# Patient Record
Sex: FEMALE | Race: OTHER
[De-identification: names, ages, dates, MRNs, and addresses within clinical notes are randomized per-mention and may not be internally consistent; named-entity substitution may affect disease eponyms.]

---

## 2018-11-09 ENCOUNTER — HOSPITAL ENCOUNTER (EMERGENCY)
Dept: HOSPITAL 61 - ER | Age: 38
Discharge: HOME | End: 2018-11-09
Payer: SELF-PAY

## 2018-11-09 VITALS — SYSTOLIC BLOOD PRESSURE: 132 MMHG | DIASTOLIC BLOOD PRESSURE: 55 MMHG

## 2018-11-09 VITALS — WEIGHT: 234.5 LBS | HEIGHT: 61 IN | BODY MASS INDEX: 44.27 KG/M2

## 2018-11-09 DIAGNOSIS — Z90.49: ICD-10-CM

## 2018-11-09 DIAGNOSIS — R10.30: ICD-10-CM

## 2018-11-09 DIAGNOSIS — Z3A.09: ICD-10-CM

## 2018-11-09 DIAGNOSIS — O99.89: Primary | ICD-10-CM

## 2018-11-09 LAB
APTT PPP: YELLOW S
BACTERIA #/AREA URNS HPF: (no result) /HPF
BILIRUB UR QL STRIP: NEGATIVE
FIBRINOGEN PPP-MCNC: CLEAR MG/DL
NITRITE UR QL STRIP: NEGATIVE
PH UR STRIP: 6 [PH]
PROT UR STRIP-MCNC: NEGATIVE MG/DL
RBC #/AREA URNS HPF: (no result) /HPF (ref 0–2)
SQUAMOUS #/AREA URNS LPF: (no result) /LPF
UROBILINOGEN UR-MCNC: 0.2 MG/DL
WBC #/AREA URNS HPF: (no result) /HPF (ref 0–4)

## 2018-11-09 PROCEDURE — 81025 URINE PREGNANCY TEST: CPT

## 2018-11-09 PROCEDURE — 81001 URINALYSIS AUTO W/SCOPE: CPT

## 2018-11-09 PROCEDURE — 99283 EMERGENCY DEPT VISIT LOW MDM: CPT

## 2018-11-09 NOTE — PHYS DOC
Past Medical History


Past Medical History:  Migraines, Other


Additional Past Medical Histor:  vertigo, pre-diabetic


Past Surgical History:  Cholecystectomy, Other


Additional Past Surgical Histo:  d&c, gallbladder


Alcohol Use:  None


Drug Use:  None





Adult General


Chief Complaint


Chief Complaint:  ABDOMINAL PAIN IN PREGNANCY





HPI


HPI





Patient is a 38  year old female who presents to the emergency room with chief 

complaint of patient has multiple symptoms during the last 2 weeks of this 

pregnancy. She is 9 weeks pregnant last menstrual. Was the first week of 

September. She is having no vaginal bleeding no dysuria she is having 

intermittent lower abdominal cramping sometimes feels cramping in her legs that 

comes and goes in addition she has had a dry cough and feels some sharp chest 

pain with coughing only no fever she felt warm the other night. She is also 

nauseous no vomiting





Review of Systems


Review of Systems





Constitutional: Denies fever or chills []


Eyes: Denies change in visual acuity, redness, or eye pain []





Cardiovascular: No additional information not addressed in HPI []


GI: 


Musculoskeletal: ]


Integument: Denies rash or skin lesions []


Neurologic: Denies headache, focal weakness or sensory changes []


Endocrine: Denies polyuria or polydipsia []





All other systems were reviewed and found to be within normal limits, except as 

documented in this note.





Current Medications


Current Medications





Current Medications








 Medications


  (Trade)  Dose


 Ordered  Sig/Starr  Start Time


 Stop Time Status Last Admin


Dose Admin


 


 Acetaminophen


  (Tylenol)  1,000 mg  1X  ONCE  11/9/18 12:30


 11/9/18 12:31 DC 11/9/18 12:18


1,000 MG











Allergies


Allergies





Allergies








Coded Allergies Type Severity Reaction Last Updated Verified


 


  No Known Drug Allergies    3/8/15 No











Physical Exam


Physical Exam





Constitutional: Well developed, well nourished, no acute distress, non-toxic 

appearance. []


HENT: Normocephalic, atraumatic, bilateral external ears normal, oropharynx 

moist, no oral exudates, nose normal. []


Eyes: PERRLA, EOMI, conjunctiva normal, no discharge. [] 


Neck: Normal range of motion, no tenderness, supple, no stridor. [] 


Cardiovascular:Heart rate regular rhythm, no murmur []


Lungs & Thorax:  Bilateral breath sounds clear to auscultation []


Abdomen: Bowel sounds normal, soft, no tenderness, no masses, no pulsatile 

masses. [] 


Skin: Warm, dry, no erythema, no rash. [] 


Extremities: No tenderness, no cyanosis, no clubbing, ROM intact, no edema. [] 


Neurologic: Alert and oriented X 3, normal motor function, normal sensory 

function, no focal deficits noted. []


Psychologic: Affect normal, judgement normal, mood normal. []





Current Patient Data


Vital Signs





 Vital Signs








  Date Time  Temp Pulse Resp B/P (MAP) Pulse Ox O2 Delivery O2 Flow Rate FiO2


 


11/9/18 11:52 97.9 71 14 143/82 (102) 98 Room Air  





 97.9       








Lab Values





 Laboratory Tests








Test


 11/9/18


11:50 11/9/18


12:07


 


Urine Collection Type Unknown   


 


Urine Color Yellow   


 


Urine Clarity Clear   


 


Urine pH 6.0   


 


Urine Specific Gravity 1.015   


 


Urine Protein


 Negative mg/dL


(NEG-TRACE) 





 


Urine Glucose (UA)


 Negative mg/dL


(NEG) 





 


Urine Ketones (Stick)


 Negative mg/dL


(NEG) 





 


Urine Blood


 Negative (NEG)


 





 


Urine Nitrite


 Negative (NEG)


 





 


Urine Bilirubin


 Negative (NEG)


 





 


Urine Urobilinogen Dipstick


 0.2 mg/dL (0.2


mg/dL) 





 


Urine Leukocyte Esterase


 Negative (NEG)


 





 


Urine RBC


 1-2 /HPF (0-2)


 





 


Urine WBC


 1-4 /HPF (0-4)


 





 


Urine Squamous Epithelial


Cells Many /LPF  


 





 


Urine Bacteria


 Few /HPF


(0-FEW) 





 


Urine Mucus Mod /LPF   


 


POC Urine HCG, Qualitative


 


 Hcg positive


(Negative)











EKG


EKG


[]





Radiology/Procedures


Radiology/Procedures


[]


Impressions:


Bedside ultrasound performed by me does show IUP with fetal heart rate of 130'S 

as well as a crown-rump length measures approximately 9 weeks 4 days





Course & Med Decision Making


Course & Med Decision Making


Pertinent Labs and Imaging studies reviewed. (See chart for details)





[]Patient has multiple vague symptoms of suspect there may be a viral syndrome 

going on lungs are clear I don't think she needs any imaging IUP was confirmed 

by bedside ultrasound urinalysis was negative for infection patient was given a 

perception for Compazine for nausea she is instructed to follow-up with [facility] 

normal for routine care.





Dragon Disclaimer


Dragon Disclaimer


This electronic medical record was generated, in whole or in part, using a 

voice recognition dictation system.





Departure


Departure


Impression:  


 Primary Impression:  


 Abdominal pain affecting pregnancy


Disposition:  01 HOME, SELF-CARE


Condition:  STABLE


Referrals:  


KENNA ROBISON MD








UNKNOWN PCP NAME (PCP)


Patient Instructions:  Abdominal Pain During Pregnancy, Easy-to-Read


Scripts


Prochlorperazine Maleate (Compazine) 10 Mg Tablet


10 MG PO Q6HRS PRN for NAUSEA/VOMITING, #30 TAB


   Prov: YAN PALMA MD         11/9/18











YAN PALMA MD Nov 9, 2018 12:59

## 2019-01-12 ENCOUNTER — HOSPITAL ENCOUNTER (EMERGENCY)
Dept: HOSPITAL 61 - ER | Age: 39
Discharge: HOME | End: 2019-01-12
Payer: SELF-PAY

## 2019-01-12 VITALS — BODY MASS INDEX: 42.48 KG/M2 | WEIGHT: 225 LBS | HEIGHT: 61 IN

## 2019-01-12 VITALS
DIASTOLIC BLOOD PRESSURE: 62 MMHG | DIASTOLIC BLOOD PRESSURE: 62 MMHG | SYSTOLIC BLOOD PRESSURE: 153 MMHG | SYSTOLIC BLOOD PRESSURE: 153 MMHG
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
## 2019-01-12 DIAGNOSIS — G43.909: ICD-10-CM

## 2019-01-12 DIAGNOSIS — O9A.212: ICD-10-CM
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
## 2019-01-12 DIAGNOSIS — Z90.49: ICD-10-CM
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
## 2019-01-12 DIAGNOSIS — O23.42: Primary | ICD-10-CM

## 2019-01-12 DIAGNOSIS — S39.011A: ICD-10-CM

## 2019-01-12 DIAGNOSIS — Z3A.18: ICD-10-CM

## 2019-01-12 LAB
APTT PPP: YELLOW S
BACTERIA #/AREA URNS HPF: (no result) /HPF
BILIRUB UR QL STRIP: NEGATIVE
FIBRINOGEN PPP-MCNC: CLEAR MG/DL
NITRITE UR QL STRIP: NEGATIVE
PH UR STRIP: 6 [PH]
PROT UR STRIP-MCNC: NEGATIVE MG/DL
RBC #/AREA URNS HPF: (no result) /HPF (ref 0–2)
SQUAMOUS #/AREA URNS LPF: (no result) /LPF
UROBILINOGEN UR-MCNC: 1 MG/DL
WBC #/AREA URNS HPF: (no result) /HPF (ref 0–4)

## 2019-01-12 PROCEDURE — 99283 EMERGENCY DEPT VISIT LOW MDM: CPT

## 2019-01-12 PROCEDURE — 81001 URINALYSIS AUTO W/SCOPE: CPT

## 2019-01-12 PROCEDURE — 87086 URINE CULTURE/COLONY COUNT: CPT

## 2019-01-12 NOTE — PHYS DOC
Past Medical History


Past Medical History:  Migraines, Other


Additional Past Medical Histor:  vertigo, pre-diabetic


Past Surgical History:  Cholecystectomy, Other


Additional Past Surgical Histo:  d&c, gallbladder


Alcohol Use:  None


Drug Use:  None





Adult General


Chief Complaint


Chief Complaint:  ABDOMINAL PAIN IN PREGNANCY





[facility]


HPI





Patient is a 38  year old female who presents with pain to her upper right 

quadrant that extends down through her right side. The patient states that it 

started suddenly 1 hour ago. She states that the pain will clench new down her 

side and then stop and then it will happen again. She does not remember 

anything causing injury. The patient is pregnant. She has had a cholecystectomy 

in the past. She denies fever, nausea, pelvic pain, vaginal bleeding or 

discharge.





Review of Systems


Review of Systems





Constitutional: Denies fever or chills []


Eyes: Denies change in visual acuity, redness, or eye pain []


HENT: Denies nasal congestion or sore throat []


Respiratory: Denies cough or shortness of breath []


Cardiovascular: No additional information not addressed in HPI []


GI: See history of present illness


: Denies dysuria or hematuria []


Musculoskeletal: Denies back pain or joint pain []


Integument: Denies rash or skin lesions []


Neurologic: Denies headache, focal weakness or sensory changes []


Endocrine: Denies polyuria or polydipsia []





All other systems were reviewed and found to be within normal limits, except as 

documented in this note.





Current Medications


Current Medications





Current Medications








 Medications


  (Trade)  Dose


 Ordered  Sig/[facility]  Start Time


 Stop Time Status Last Admin


Dose Admin


 


 Acetaminophen


  (Tylenol)  1,000 mg  1X  ONCE  1/12/19 21:15


 1/12/19 21:16 DC 1/12/19 21:11


1,000 MG











Allergies


Allergies





Allergies








Coded Allergies Type Severity Reaction Last Updated Verified


 


  No Known Drug Allergies    3/8/15 No











Physical Exam


Physical Exam





Constitutional: Well developed, well nourished, no acute distress, non-toxic 

appearance. []


Cardiovascular:Heart rate regular rhythm, no murmur []


Lungs & Thorax:  Bilateral breath sounds clear to auscultation []


Abdomen: Bowel sounds normal, soft, mild right upper quadrant tenderness that 

appears musculoskeletal in nature extending down the right side of the abdomen, 

no masses, no pulsatile masses. [] 


Skin: Warm, dry, no erythema, no rash. [] 


Back: No tenderness, no CVA tenderness. [] 


Extremities: No tenderness, no cyanosis, no clubbing, ROM intact, no edema. [] 


Neurologic: Alert and oriented X 3, normal motor function, normal sensory 

function, no focal deficits noted. []


Psychologic: Affect normal, judgement normal, mood normal. []





Current Patient Data


Vital Signs





 Vital Signs








  Date Time  Temp Pulse Resp B/P (MAP) Pulse Ox O2 Delivery O2 Flow Rate FiO2


 


1/12/19 20:05 97.7 80 19 138/63 (88) 98 Room Air  





 97.7       








Lab Values





 Laboratory Tests








Test


 1/12/19


20:15


 


Urine Collection Type Unknown  


 


Urine Color Yellow  


 


Urine Clarity Clear  


 


Urine pH 6.0  


 


Urine Specific Gravity 1.025  


 


Urine Protein


 Negative mg/dL


(NEG-TRACE)


 


Urine Glucose (UA)


 Negative mg/dL


(NEG)


 


Urine Ketones (Stick)


 Negative mg/dL


(NEG)


 


Urine Blood


 Negative (NEG)





 


Urine Nitrite


 Negative (NEG)





 


Urine Bilirubin


 Negative (NEG)





 


Urine Urobilinogen Dipstick


 1.0 mg/dL (0.2


mg/dL)


 


Urine Leukocyte Esterase Trace (NEG)  


 


Urine RBC


 Occ /HPF (0-2)





 


Urine WBC


 1-4 /HPF (0-4)





 


Urine Squamous Epithelial


Cells Many /LPF  





 


Urine Bacteria


 Many /HPF


(0-FEW)











EKG


EKG


[]





Radiology/Procedures


Radiology/Procedures


[]





Course & Med Decision Making


Course & Med Decision Making


Pertinent Labs and Imaging studies reviewed. (See chart for details)





[]I explained to the patient that there is difficulty in doing any sort of 

imaging while pregnant. She does not have a gallbladder that could be causing 

right upper quadrant pain. Since this is been such a short onset and does seem 

musculoskeletal in nature it is probably safest to just observe the pain and 

see if it resolves. She was given a dose of Tylenol in the emergency 

department. She does have leukocytes in her urine and is being treated for a 

urinary tract infection. She is to follow-up with her obstetrician for a urine 

recheck. She is to return immediately to the emergency department if having 

worsening abdominal pain. She is in agreement with this plan.





Dragon Disclaimer


Dragon Disclaimer


This electronic medical record was generated, in whole or in part, using a 

voice recognition dictation system.





Departure


Departure


Impression:  


 Primary Impression:  


 UTI in pregnancy


 Additional Impression:  


 Abdominal muscle strain


Disposition:  01 HOME, SELF-CARE


Condition:  STABLE


Referrals:  


UNKNOWN PCP NAME (PCP)


Patient Instructions:  Abdominal Pain During Pregnancy, Pregnancy - Urinary 

Tract Infection





Additional Instructions:  


You may take Tylenol for pain. Rest for the next 24 hours. If you have 

increased pain, nausea, vomiting, fever, diarrhea, pelvic pain or vaginal 

bleeding return to the emergency department. Follow-up with your obstetrician 

for a recheck in 3 days.


Scripts


Nitrofurantoin Monohyd/M-Cryst (MACROBID 100 MG CAPSULE) 100 Mg Capsule


1 CAP PO BID for UTI, #14 CAP


   Prov: FELIPE MUNOZ         1/12/19





Problem Qualifiers











FELIPE MUNOZ Jan 12, 2019 21:25

## 2019-04-08 ENCOUNTER — HOSPITAL ENCOUNTER (OUTPATIENT)
Dept: HOSPITAL 61 - 3 SO LND | Age: 39
Setting detail: OBSERVATION
Discharge: HOME | End: 2019-04-08
Attending: SPECIALIST | Admitting: SPECIALIST
Payer: SELF-PAY

## 2019-04-08 VITALS — BODY MASS INDEX: 45.5 KG/M2 | WEIGHT: 241 LBS | HEIGHT: 61 IN

## 2019-04-08 DIAGNOSIS — O26.893: ICD-10-CM

## 2019-04-08 DIAGNOSIS — Z3A.30: ICD-10-CM

## 2019-04-08 DIAGNOSIS — N89.8: ICD-10-CM

## 2019-04-08 LAB
ALBUMIN SERPL-MCNC: 2.2 G/DL (ref 3.4–5)
ALBUMIN/GLOB SERPL: 0.5 {RATIO} (ref 1–1.7)
ALP SERPL-CCNC: 138 U/L (ref 46–116)
ALT SERPL-CCNC: 138 U/L (ref 14–59)
AMNIO PT: NEGATIVE
AMPHETAMINE/METHAMPHETAMINE: (no result)
ANION GAP SERPL CALC-SCNC: 11 MMOL/L (ref 6–14)
APTT PPP: YELLOW S
AST SERPL-CCNC: 57 U/L (ref 15–37)
BACTERIA #/AREA URNS HPF: (no result) /HPF
BARBITURATES UR-MCNC: (no result) UG/ML
BASOPHILS # BLD AUTO: 0 X10^3/UL (ref 0–0.2)
BASOPHILS NFR BLD: 0 % (ref 0–3)
BENZODIAZ UR-MCNC: (no result) UG/L
BILIRUB SERPL-MCNC: 0.2 MG/DL (ref 0.2–1)
BILIRUB UR QL STRIP: NEGATIVE
BUN SERPL-MCNC: 6 MG/DL (ref 7–20)
BUN/CREAT SERPL: 12 (ref 6–20)
CALCIUM SERPL-MCNC: 8.7 MG/DL (ref 8.5–10.1)
CANNABINOIDS UR-MCNC: (no result) UG/L
CHLORIDE SERPL-SCNC: 104 MMOL/L (ref 98–107)
CO2 SERPL-SCNC: 24 MMOL/L (ref 21–32)
COCAINE UR-MCNC: (no result) NG/ML
CREAT SERPL-MCNC: 0.5 MG/DL (ref 0.6–1)
EOSINOPHIL NFR BLD: 0.2 X10^3/UL (ref 0–0.7)
EOSINOPHIL NFR BLD: 2 % (ref 0–3)
ERYTHROCYTE [DISTWIDTH] IN BLOOD BY AUTOMATED COUNT: 16.4 % (ref 11.5–14.5)
FIBRINOGEN PPP-MCNC: CLEAR MG/DL
GFR SERPLBLD BASED ON 1.73 SQ M-ARVRAT: 136 ML/MIN
GLOBULIN SER-MCNC: 4.5 G/DL (ref 2.2–3.8)
GLUCOSE SERPL-MCNC: 72 MG/DL (ref 70–99)
HCT VFR BLD CALC: 29.3 % (ref 36–47)
HGB BLD-MCNC: 9.6 G/DL (ref 12–15.5)
LYMPHOCYTES # BLD: 2.2 X10^3/UL (ref 1–4.8)
LYMPHOCYTES NFR BLD AUTO: 25 % (ref 24–48)
MCH RBC QN AUTO: 27 PG (ref 25–35)
MCHC RBC AUTO-ENTMCNC: 33 G/DL (ref 31–37)
MCV RBC AUTO: 82 FL (ref 79–100)
METHADONE SERPL-MCNC: (no result) NG/ML
MONO #: 0.5 X10^3/UL (ref 0–1.1)
MONOCYTES NFR BLD: 5 % (ref 0–9)
NEUT #: 5.9 X10^3UL (ref 1.8–7.7)
NEUTROPHILS NFR BLD AUTO: 68 % (ref 31–73)
NITRITE UR QL STRIP: NEGATIVE
OPIATES UR-MCNC: (no result) NG/ML
PCP SERPL-MCNC: (no result) MG/DL
PH UR STRIP: 7.5 [PH]
PLATELET # BLD AUTO: 337 X10^3/UL (ref 140–400)
POTASSIUM SERPL-SCNC: 3.8 MMOL/L (ref 3.5–5.1)
PROT SERPL-MCNC: 6.7 G/DL (ref 6.4–8.2)
PROT UR STRIP-MCNC: NEGATIVE MG/DL
RBC # BLD AUTO: 3.56 X10^6/UL (ref 3.5–5.4)
RBC #/AREA URNS HPF: 0 /HPF (ref 0–2)
SODIUM SERPL-SCNC: 139 MMOL/L (ref 136–145)
SQUAMOUS #/AREA URNS LPF: (no result) /LPF
UROBILINOGEN UR-MCNC: 1 MG/DL
WBC # BLD AUTO: 8.8 X10^3/UL (ref 4–11)
WBC #/AREA URNS HPF: 0 /HPF (ref 0–4)

## 2019-04-08 PROCEDURE — 76815 OB US LIMITED FETUS(S): CPT

## 2019-04-08 PROCEDURE — 36415 COLL VENOUS BLD VENIPUNCTURE: CPT

## 2019-04-08 PROCEDURE — 80307 DRUG TEST PRSMV CHEM ANLYZR: CPT

## 2019-04-08 PROCEDURE — 84443 ASSAY THYROID STIM HORMONE: CPT

## 2019-04-08 PROCEDURE — G0378 HOSPITAL OBSERVATION PER HR: HCPCS

## 2019-04-08 PROCEDURE — 84112 EVAL AMNIOTIC FLUID PROTEIN: CPT

## 2019-04-08 PROCEDURE — 83036 HEMOGLOBIN GLYCOSYLATED A1C: CPT

## 2019-04-08 PROCEDURE — G0379 DIRECT REFER HOSPITAL OBSERV: HCPCS

## 2019-04-08 PROCEDURE — 81001 URINALYSIS AUTO W/SCOPE: CPT

## 2019-04-08 PROCEDURE — 85025 COMPLETE CBC W/AUTO DIFF WBC: CPT

## 2019-04-08 PROCEDURE — 80053 COMPREHEN METABOLIC PANEL: CPT

## 2019-04-08 PROCEDURE — 82962 GLUCOSE BLOOD TEST: CPT

## 2019-04-08 RX ADMIN — SODIUM CHLORIDE, SODIUM LACTATE, POTASSIUM CHLORIDE, AND CALCIUM CHLORIDE SCH MLS/HR: .6; .31; .03; .02 INJECTION, SOLUTION INTRAVENOUS at 21:08

## 2019-04-08 RX ADMIN — SODIUM CHLORIDE, SODIUM LACTATE, POTASSIUM CHLORIDE, AND CALCIUM CHLORIDE SCH MLS/HR: .6; .31; .03; .02 INJECTION, SOLUTION INTRAVENOUS at 21:07

## 2019-04-08 NOTE — RAD
INDICATION: 30W PREGNANT LEAKING FLUID MARCO 

 

COMPARISON: None.

 

TECHNIQUE: Grayscale and color ultrasound images uterus.

 

FINDINGS: 

 

Intrauterine pregnancy is identified.

Fetal heartbeat is 140.

Vertex presentation at time of exam.

The cervix is obscured by the fetal head.

Placenta anterior wall.

Amniotic fluid index 11.9.

Limited evaluation of fetal anatomy secondary to age and positioning.

BPD 79 mm, 31 week 4 day

Head circumflex conference 294 mm, 32 week 3 day

Abdominal circumference 269 mm, 31 weeks 0 day

Femur length 59 mm, 31 weeks 0 day

Head circumference 200, hc/ac ratio 1.1

Estimated fetal weight 1717 g. 50th percentile

Estimated gestational age 31 weeks 4 days with estimated due date 

6/6/2019.

 

IMPRESSION: 

 

1.   Intrauterine pregnancy is identified with positive heartbeat and 

estimated gestational age of 31 weeks and 4 days. The cervix is not 

visualized with an amniotic fluid index of 11.9.

 

Electronically signed by: Ismael Castro MD (4/8/2019 9:00 PM) [facility]

## 2019-04-09 LAB — HBA1C MFR BLD: 5.7 % (ref 4.8–5.6)

## 2019-10-24 ENCOUNTER — HOSPITAL ENCOUNTER (EMERGENCY)
Dept: HOSPITAL 61 - ER | Age: 39
Discharge: HOME | End: 2019-10-24
Payer: SELF-PAY

## 2019-10-24 VITALS — WEIGHT: 232 LBS | BODY MASS INDEX: 43.8 KG/M2 | HEIGHT: 61 IN

## 2019-10-24 VITALS — SYSTOLIC BLOOD PRESSURE: 115 MMHG | DIASTOLIC BLOOD PRESSURE: 72 MMHG

## 2019-10-24 DIAGNOSIS — G43.909: ICD-10-CM

## 2019-10-24 DIAGNOSIS — R19.7: ICD-10-CM

## 2019-10-24 DIAGNOSIS — R10.31: Primary | ICD-10-CM

## 2019-10-24 DIAGNOSIS — Z90.49: ICD-10-CM

## 2019-10-24 LAB
ALBUMIN SERPL-MCNC: 3.7 G/DL (ref 3.4–5)
ALBUMIN/GLOB SERPL: 0.8 {RATIO} (ref 1–1.7)
ALP SERPL-CCNC: 80 U/L (ref 46–116)
ALT SERPL-CCNC: 15 U/L (ref 14–59)
ANION GAP SERPL CALC-SCNC: 11 MMOL/L (ref 6–14)
APTT PPP: YELLOW S
AST SERPL-CCNC: 14 U/L (ref 15–37)
BACTERIA #/AREA URNS HPF: (no result) /HPF
BASOPHILS # BLD AUTO: 0 X10^3/UL (ref 0–0.2)
BASOPHILS NFR BLD: 0 % (ref 0–3)
BILIRUB SERPL-MCNC: 0.4 MG/DL (ref 0.2–1)
BILIRUB UR QL STRIP: NEGATIVE
BUN SERPL-MCNC: 16 MG/DL (ref 7–20)
BUN/CREAT SERPL: 20 (ref 6–20)
CALCIUM SERPL-MCNC: 8.5 MG/DL (ref 8.5–10.1)
CHLORIDE SERPL-SCNC: 107 MMOL/L (ref 98–107)
CO2 SERPL-SCNC: 22 MMOL/L (ref 21–32)
CREAT SERPL-MCNC: 0.8 MG/DL (ref 0.6–1)
EOSINOPHIL NFR BLD: 0.2 X10^3/UL (ref 0–0.7)
EOSINOPHIL NFR BLD: 2 % (ref 0–3)
ERYTHROCYTE [DISTWIDTH] IN BLOOD BY AUTOMATED COUNT: 19.1 % (ref 11.5–14.5)
FIBRINOGEN PPP-MCNC: CLEAR MG/DL
GFR SERPLBLD BASED ON 1.73 SQ M-ARVRAT: 79 ML/MIN
GLOBULIN SER-MCNC: 4.8 G/DL (ref 2.2–3.8)
GLUCOSE SERPL-MCNC: 105 MG/DL (ref 70–99)
HCT VFR BLD CALC: 34.9 % (ref 36–47)
HGB BLD-MCNC: 11 G/DL (ref 12–15.5)
HYPOCHROMIA BLD QL SMEAR: (no result)
LIPASE: 143 U/L (ref 73–393)
LYMPHOCYTES # BLD: 2.2 X10^3/UL (ref 1–4.8)
LYMPHOCYTES NFR BLD AUTO: 18 % (ref 24–48)
MAGNESIUM SERPL-MCNC: 2.2 MG/DL (ref 1.8–2.4)
MCH RBC QN AUTO: 23 PG (ref 25–35)
MCHC RBC AUTO-ENTMCNC: 32 G/DL (ref 31–37)
MCV RBC AUTO: 72 FL (ref 79–100)
MONO #: 0.3 X10^3/UL (ref 0–1.1)
MONOCYTES NFR BLD: 3 % (ref 0–9)
NEUT #: 9.4 X10^3/UL (ref 1.8–7.7)
NEUTROPHILS NFR BLD AUTO: 77 % (ref 31–73)
NITRITE UR QL STRIP: NEGATIVE
PH UR STRIP: 5 [PH]
PLATELET # BLD AUTO: 439 X10^3/UL (ref 140–400)
PLATELET # BLD EST: (no result) 10*3/UL
POTASSIUM SERPL-SCNC: 3.9 MMOL/L (ref 3.5–5.1)
PROT SERPL-MCNC: 8.5 G/DL (ref 6.4–8.2)
PROT UR STRIP-MCNC: NEGATIVE MG/DL
RBC # BLD AUTO: 4.83 X10^6/UL (ref 3.5–5.4)
RBC #/AREA URNS HPF: (no result) /HPF (ref 0–2)
SODIUM SERPL-SCNC: 140 MMOL/L (ref 136–145)
SQUAMOUS #/AREA URNS LPF: (no result) /LPF
U PREG PATIENT: NEGATIVE
UROBILINOGEN UR-MCNC: 0.2 MG/DL
WBC # BLD AUTO: 12.2 X10^3/UL (ref 4–11)
WBC #/AREA URNS HPF: (no result) /HPF (ref 0–4)

## 2019-10-24 PROCEDURE — 96375 TX/PRO/DX INJ NEW DRUG ADDON: CPT

## 2019-10-24 PROCEDURE — 74177 CT ABD & PELVIS W/CONTRAST: CPT

## 2019-10-24 PROCEDURE — 80053 COMPREHEN METABOLIC PANEL: CPT

## 2019-10-24 PROCEDURE — 83735 ASSAY OF MAGNESIUM: CPT

## 2019-10-24 PROCEDURE — 99285 EMERGENCY DEPT VISIT HI MDM: CPT

## 2019-10-24 PROCEDURE — 96374 THER/PROPH/DIAG INJ IV PUSH: CPT

## 2019-10-24 PROCEDURE — 96361 HYDRATE IV INFUSION ADD-ON: CPT

## 2019-10-24 PROCEDURE — 36415 COLL VENOUS BLD VENIPUNCTURE: CPT

## 2019-10-24 PROCEDURE — 81025 URINE PREGNANCY TEST: CPT

## 2019-10-24 PROCEDURE — 83690 ASSAY OF LIPASE: CPT

## 2019-10-24 PROCEDURE — 85025 COMPLETE CBC W/AUTO DIFF WBC: CPT

## 2019-10-24 PROCEDURE — 87086 URINE CULTURE/COLONY COUNT: CPT

## 2019-10-24 PROCEDURE — 81001 URINALYSIS AUTO W/SCOPE: CPT

## 2019-10-24 NOTE — PHYS DOC
Past Medical History


Past Medical History:  Migraines, Other


Additional Past Medical Histor:  vertigo, pre-diabetic


Past Surgical History:  Cholecystectomy, Other


Additional Past Surgical Histo:  d&c, gallbladder


Alcohol Use:  None


Drug Use:  None





Adult General


Chief Complaint


Chief Complaint:  ABDOMINAL PAIN





HPI


HPI





Patient is a 39  year old female who presents to the ER with complaints of 

abdominal pain, nausea, and diarrhea since yesterday. She states the pain is 

epigastric and wraps around to her back. Pt also reports RLQ abd pain and 

suprapubic TTP. She denies any dysuria, increased urinary frequency, or 

hematuria. Pt reports she has had 3 episodes of diarrhea today but denies any 

vomiting. She currently rates her pain a 7/10 on the pain scale. She denies any 

alleviating factors.





Review of Systems


Review of Systems





Constitutional: Denies fever or chills []


Eyes: Denies redness, or eye pain []


HENT: Denies nasal congestion or sore throat []


Respiratory: Denies cough or shortness of breath []


Cardiovascular: No additional information not addressed in HPI []


GI: see HPI


: Denies dysuria or hematuria []


Musculoskeletal: Denies joint pain []


Integument: Denies rash or skin lesions []


Neurologic: Denies headache, focal weakness or sensory changes []


Endocrine: Denies polyuria or polydipsia []





Complete systems were reviewed and found to be within normal limits, except as 

documented in this note.





Current Medications


Current Medications





Current Medications








 Medications


  (Trade)  Dose


 Ordered  Sig/Starr  Start Time


 Stop Time Status Last Admin


Dose Admin


 


 Fentanyl Citrate


  (Fentanyl 2ml


 Vial)  50 mcg  1X  ONCE  10/24/19 16:30


 10/24/19 16:31 DC 10/24/19 17:13


50 MCG


 


 Info


  (CONTRAST GIVEN


 -- Rx MONITORING)  1 each  PRN DAILY  PRN  10/24/19 17:30


 10/26/19 17:29   





 


 Iohexol


  (Omnipaque 300


 Mg/ml)  75 ml  1X  ONCE  10/24/19 17:30


 10/24/19 17:31 DC 10/24/19 17:30


75 ML


 


 Ondansetron HCl


  (Zofran)  4 mg  1X  ONCE  10/24/19 16:30


 10/24/19 16:31 DC 10/24/19 17:12


4 MG


 


 Sodium Chloride  1,000 ml @ 


 1,000 mls/hr  1X  ONCE  10/24/19 16:30


 10/24/19 17:29 DC 10/24/19 17:12


1,000 MLS/HR











Allergies


Allergies





Allergies








Coded Allergies Type Severity Reaction Last Updated Verified


 


  No Known Drug Allergies    3/8/15 No











Physical Exam


Physical Exam





Constitutional: Well developed, well nourished, no acute distress, non-toxic 

appearance, obese. []


HENT: Normocephalic, atraumatic, bilateral external ears normal, nose normal. []


Eyes: PERRLA, EOMI, conjunctiva normal, no discharge. [] 


Neck: Normal range of motion, no stridor. [] 


Cardiovascular:Heart rate regular rhythm, no murmur []


Lungs & Thorax:  Bilateral breath sounds clear to auscultation []


Abdomen: Bowel sounds normal, soft, RLQ TTP, epigastric TTP, no rebound 

tenderness, no masses, no pulsatile masses. [] 


Skin: Warm, dry, no erythema, no rash. [] 


Back: No tenderness, no CVA tenderness. [] 


Extremities: No cyanosis, ROM intact, no edema. [] 


Neurologic: Alert and oriented X 3, no focal deficits noted. []


Psychologic: Affect normal, judgement normal, mood normal. []





Current Patient Data


Vital Signs





                                   Vital Signs








  Date Time  Temp Pulse Resp B/P (MAP) Pulse Ox O2 Delivery O2 Flow Rate FiO2


 


10/24/19 17:13   16  99 Room Air  


 


10/24/19 16:20 97.7 84  131/67 (88)    





 97.7       








Lab Values





                                Laboratory Tests








Test


 10/24/19


15:50 10/24/19


16:16


 


Urine Collection Type Unknown   


 


Urine Color Yellow   


 


Urine Clarity Clear   


 


Urine pH 5.0   


 


Urine Specific Gravity 1.025   


 


Urine Protein


 Negative mg/dL


(NEG-TRACE) 





 


Urine Glucose (UA)


 Negative mg/dL


(NEG) 





 


Urine Ketones (Stick)


 Negative mg/dL


(NEG) 





 


Urine Blood


 Moderate (NEG)


 





 


Urine Nitrite


 Negative (NEG)


 





 


Urine Bilirubin


 Negative (NEG)


 





 


Urine Urobilinogen Dipstick


 0.2 mg/dL (0.2


mg/dL) 





 


Urine Leukocyte Esterase


 Negative (NEG)


 





 


Urine RBC


 Rare /HPF


(0-2) 





 


Urine WBC


 Rare /HPF


(0-4) 





 


Urine Squamous Epithelial


Cells Many /LPF  


 





 


Urine Bacteria


 Many /HPF


(0-FEW) 





 


Urine Mucus Marked /LPF   


 


Urine Pregnancy Test


 Negative (NEG)


 





 


White Blood Count


 


 12.2 x10^3/uL


(4.0-11.0)  H


 


Red Blood Count


 


 4.83 x10^6/uL


(3.50-5.40)


 


Hemoglobin


 


 11.0 g/dL


(12.0-15.5)  L


 


Hematocrit


 


 34.9 %


(36.0-47.0)  L


 


Mean Corpuscular Volume


 


 72 fL ()


L


 


Mean Corpuscular Hemoglobin


 


 23 pg (25-35)


L


 


Mean Corpuscular Hemoglobin


Concent 


 32 g/dL


(31-37)


 


Red Cell Distribution Width


 


 19.1 %


(11.5-14.5)  H


 


Platelet Count


 


 439 x10^3/uL


(140-400)  H


 


Neutrophils (%) (Auto)  77 % (31-73)  H


 


Lymphocytes (%) (Auto)  18 % (24-48)  L


 


Monocytes (%) (Auto)  3 % (0-9)  


 


Eosinophils (%) (Auto)  2 % (0-3)  


 


Basophils (%) (Auto)  0 % (0-3)  


 


Neutrophils # (Auto)


 


 9.4 x10^3/uL


(1.8-7.7)  H


 


Lymphocytes # (Auto)


 


 2.2 x10^3/uL


(1.0-4.8)


 


Monocytes # (Auto)


 


 0.3 x10^3/uL


(0.0-1.1)


 


Eosinophils # (Auto)


 


 0.2 x10^3/uL


(0.0-0.7)


 


Basophils # (Auto)


 


 0.0 x10^3/uL


(0.0-0.2)


 


Platelet Estimate


 


 Increased


(ADEQUATE)


 


Hypochromasia  Mod  


 


Sodium Level


 


 140 mmol/L


(136-145)


 


Potassium Level


 


 3.9 mmol/L


(3.5-5.1)


 


Chloride Level


 


 107 mmol/L


()


 


Carbon Dioxide Level


 


 22 mmol/L


(21-32)


 


Anion Gap  11 (6-14)  


 


Blood Urea Nitrogen


 


 16 mg/dL


(7-20)


 


Creatinine


 


 0.8 mg/dL


(0.6-1.0)


 


Estimated GFR


(Cockcroft-Gault) 


 79.0  





 


BUN/Creatinine Ratio  20 (6-20)  


 


Glucose Level


 


 105 mg/dL


(70-99)  H


 


Calcium Level


 


 8.5 mg/dL


(8.5-10.1)


 


Magnesium Level


 


 2.2 mg/dL


(1.8-2.4)


 


Total Bilirubin


 


 0.4 mg/dL


(0.2-1.0)


 


Aspartate Amino Transferase


(AST) 


 14 U/L (15-37)


L


 


Alanine Aminotransferase (ALT)


 


 15 U/L (14-59)





 


Alkaline Phosphatase


 


 80 U/L


()


 


Total Protein


 


 8.5 g/dL


(6.4-8.2)  H


 


Albumin


 


 3.7 g/dL


(3.4-5.0)


 


Albumin/Globulin Ratio


 


 0.8 (1.0-1.7)


L


 


Lipase


 


 143 U/L


()





                                Laboratory Tests


10/24/19 16:16








                                Laboratory Tests


10/24/19 16:16














EKG


EKG


[]





Radiology/Procedures


Radiology/Procedures


PROCEDURE: CT ABD PELV W/ IV CONTRST ONLY





CT scan abdomen and pelvis with contrast 10/24/2019


 


CLINICAL HISTORY: Abdominal pain.


 


TECHNIQUE: After the intravenous administration of 75 cc of Omnipaque 300 


only, contiguous, 5 mm axial sections were obtained to the abdomen and 


pelvis.


 


One or more of the following individualized dose reduction techniques were


utilized for this study:


 


1. Automated exposure control.


2. Adjustment of the mA and/or kV according to patient size.


3. Use of iterative reconstruction technique.


 


 


 


FINDINGS: Comparison is made to ultrasound of the right quadrant abdomen 


dated 5/9/2016.


 


Images through the lung bases are within normal limits.


 


The liver, spleen, pancreas, adrenal glands and kidneys are within normal 


limits.


 


The abdominal aorta tapers normally. Surgical clips are seen within the 


gallbladder fossa consistent with a cholecystectomy. No free fluid or free


air is within the abdomen. Mildly dilated fluid-filled small bowel loops 


are seen within the abdomen without definite evidence of bowel 


obstruction. Air and stool seen throughout the colon. The appendix is 


well-visualized and is within normal limits.


 


Images of the pelvis demonstrate the urinary bladder is distended with 


urine. No adnexal mass is seen. No free fluid is noted. Minimal S-shaped 


curvature of the thoracolumbar spine is seen. Degenerative changes are 


seen involving the lower thoracic and throughout the lumbar spine.


 


IMPRESSION: Mildly dilated fluid-filled small bowel loops are seen 


throughout the abdomen without definite evidence of bowel obstruction.





Course & Med Decision Making


Course & Med Decision Making


Pertinent Labs and Imaging studies reviewed. (See chart for details)


dx: nausea and diarrhea


CBC: WBC 12.2, Hgb 11.5 Hct 34.9; CMP glucose 105 otherwise unremarkable. UA 

unremarkable. 


Hgb/Hct is improved from last visit


CT abd/pel is negative for acute findings. 


PT was given 1L NS, 4 mg zofran, and 50 mcg of fentanyl in the ER. VSS Pt 

requests more pain medication before d/c home. 4 mg of morphine ordered. 


Results discussed with patient. 


Prescription for zofran. Recommend clear fluids x24 hours then advance as t

olerated starting with bland foods. 


Follow up with your PCP in 1-2 days. Return to ER if sx worsen, pain increases, 

or fever develops. 


Pt verbalized an understanding of home care, medications, follow-up, and return 

to ED instructions and was in agreement with the plan of care.





Dragon Disclaimer


Dragon Disclaimer


This electronic medical record was generated, in whole or in part, using a voice

 recognition dictation system.





Departure


Departure


Impression:  


   Primary Impression:  


   Abdominal pain


   Additional Impression:  


   Diarrhea


Disposition:  01 HOME, SELF-CARE


Condition:  STABLE


Referrals:  


UNKNOWN PCP NAME (PCP)


Patient Instructions:  Abdominal Pain (Nonspecific), Diarrhea, Easy-to-Read, 

Diet for Diarrhea, Adult





Additional Instructions:  


Fill prescriptions and use them as directed. Recommend clear fluids for the next

 24 hours. Then you may advance to bland foods such as bananas, rice, 

applesauce, and dry toast. Follow-up with your primary care doctor in the next 

1-2 days. Return to the emergency room if your symptoms worsen.


Scripts


Ondansetron (ONDANSETRON ODT) 4 Mg Tab.rapdis


1 TAB PO PRN Q6-8HRS PRN for NAUSEA/VOMITING for 4 Days, #16 TAB 0 Refills


   Prov: TRISTON QUINONEZ         10/24/19





Problem Qualifiers








   Primary Impression:  


   Abdominal pain


   Abdominal location:  right lower quadrant  Qualified Codes:  R10.31 - Right 

   lower quadrant pain


   Additional Impression:  


   Diarrhea


   Diarrhea type:  unspecified type  Qualified Codes:  R19.7 - Diarrhea, 

   unspecified








TRISTON QUINONEZ APRN       Oct 24, 2019 17:11

## 2019-10-24 NOTE — RAD
CT scan abdomen and pelvis with contrast 10/24/2019

 

CLINICAL HISTORY: Abdominal pain.

 

TECHNIQUE: After the intravenous administration of 75 cc of Omnipaque 300 

only, contiguous, 5 mm axial sections were obtained to the abdomen and 

pelvis.

 

One or more of the following individualized dose reduction techniques were

utilized for this study:

 

1. Automated exposure control.

2. Adjustment of the mA and/or kV according to patient size.

3. Use of iterative reconstruction technique.

 

 

 

FINDINGS: Comparison is made to ultrasound of the right quadrant abdomen 

dated 5/9/2016.

 

Images through the lung bases are within normal limits.

 

The liver, spleen, pancreas, adrenal glands and kidneys are within normal 

limits.

 

The abdominal aorta tapers normally. Surgical clips are seen within the 

gallbladder fossa consistent with a cholecystectomy. No free fluid or free

air is within the abdomen. Mildly dilated fluid-filled small bowel loops 

are seen within the abdomen without definite evidence of bowel 

obstruction. Air and stool seen throughout the colon. The appendix is 

well-visualized and is within normal limits.

 

Images of the pelvis demonstrate the urinary bladder is distended with 

urine. No adnexal mass is seen. No free fluid is noted. Minimal S-shaped 

curvature of the thoracolumbar spine is seen. Degenerative changes are 

seen involving the lower thoracic and throughout the lumbar spine.

 

IMPRESSION: Mildly dilated fluid-filled small bowel loops are seen 

throughout the abdomen without definite evidence of bowel obstruction.

 

Electronically signed by: Valente Hatfield MD (10/24/2019 6:21 PM) [facility]

## 2022-01-06 ENCOUNTER — HOSPITAL ENCOUNTER (EMERGENCY)
Dept: HOSPITAL 61 - ER | Age: 42
Discharge: LEFT BEFORE DISCHARGE | End: 2022-01-06
Payer: SELF-PAY

## 2022-01-06 VITALS — HEIGHT: 61 IN | WEIGHT: 238.98 LBS | BODY MASS INDEX: 45.12 KG/M2

## 2022-01-06 VITALS — DIASTOLIC BLOOD PRESSURE: 45 MMHG | SYSTOLIC BLOOD PRESSURE: 137 MMHG

## 2022-01-06 DIAGNOSIS — Z53.21: ICD-10-CM

## 2022-01-06 DIAGNOSIS — R10.30: Primary | ICD-10-CM

## 2022-01-06 LAB
APTT PPP: YELLOW S
BACTERIA #/AREA URNS HPF: (no result) /HPF
BILIRUB UR QL STRIP: NEGATIVE
FIBRINOGEN PPP-MCNC: CLEAR MG/DL
NITRITE UR QL STRIP: NEGATIVE
PH UR STRIP: 5.5 [PH]
PROT UR STRIP-MCNC: NEGATIVE MG/DL
RBC #/AREA URNS HPF: 0 /HPF (ref 0–2)
U PREG PATIENT: NEGATIVE
UROBILINOGEN UR-MCNC: 0.2 MG/DL
WBC #/AREA URNS HPF: (no result) /HPF (ref 0–4)

## 2022-01-06 PROCEDURE — 81001 URINALYSIS AUTO W/SCOPE: CPT

## 2022-01-06 PROCEDURE — 81025 URINE PREGNANCY TEST: CPT
